# Patient Record
Sex: FEMALE | Race: WHITE | NOT HISPANIC OR LATINO | URBAN - METROPOLITAN AREA
[De-identification: names, ages, dates, MRNs, and addresses within clinical notes are randomized per-mention and may not be internally consistent; named-entity substitution may affect disease eponyms.]

---

## 2021-01-01 ENCOUNTER — EMERGENCY (EMERGENCY)
Facility: HOSPITAL | Age: 81
LOS: 0 days | End: 2021-11-10
Attending: EMERGENCY MEDICINE
Payer: COMMERCIAL

## 2021-01-01 DIAGNOSIS — I46.9 CARDIAC ARREST, CAUSE UNSPECIFIED: ICD-10-CM

## 2021-01-01 PROCEDURE — 99285 EMERGENCY DEPT VISIT HI MDM: CPT | Mod: 25

## 2021-01-01 PROCEDURE — 92960 CARDIOVERSION ELECTRIC EXT: CPT

## 2021-11-10 NOTE — ED ADULT NURSE NOTE - OBJECTIVE STATEMENT
per EMS pt found unresponsive , unknown downtime . hx of lung CA . EMS gave 7 EPI, 1 bicarb, 1 am meme. pt intubated 7.5 tube present R EJ inserted by EMS., ACLS protocol  in progress.

## 2021-11-10 NOTE — ED PROVIDER NOTE - CLINICAL SUMMARY MEDICAL DECISION MAKING FREE TEXT BOX
82 yo F with cardiac arrest, terminal dx  -tod called 7:23 pm  -CPR continued in ER with no rosc, pt. shocked once for v-fib, epi given, bicarb, calcium  -daughter informed of death in ER  -will call ME

## 2021-11-10 NOTE — ED PROVIDER NOTE - PHYSICAL EXAMINATION
Gen: unresponsive, no spontaneous movement or respiration  Head: ncat, perrla, eomi b/l  Neck: supple, no lymphadenopathy, no midline deviation  Heart: rrr, no m/r/g  Lungs: CTA b/l, no rales/ronchi/wheezes  Abd: soft, nontender, non-distended, no rebound or guarding  Ext: no clubbing/cyanosis/edema  Neuro: pupils fixed, dilated, no gag or corneal reflexes, no spontaneous movement   bedside cardio US shows cardiac standstill, no pericardial effusion, no bowing of RV  derm: warm skin, no rash

## 2021-11-10 NOTE — ED ADULT NURSE NOTE - CHIEF COMPLAINT QUOTE
BIBA for cardiac arrest with active compressions being performed. unknown downtime. received 7 epis, 1 bicarb, 1 calcium with EMS. 18g in R EJ by EMS. intubated prior to arrival. hx lung cancer  downtime recovery

## 2021-11-10 NOTE — ED ADULT TRIAGE NOTE - CHIEF COMPLAINT QUOTE
BIBA for cardiac arrest with active compressions being performed. unknown downtime. received 7 epis, 1 bicarb, 1 calcium with EMS. 18g in R EJ by EMS. intubated prior to arrival.  downtime recovery BIBA for cardiac arrest with active compressions being performed. unknown downtime. received 7 epis, 1 bicarb, 1 calcium with EMS. 18g in R EJ by EMS. intubated prior to arrival. hx lung cancer  downtime recovery